# Patient Record
Sex: FEMALE | Race: WHITE | NOT HISPANIC OR LATINO | Employment: STUDENT | URBAN - METROPOLITAN AREA
[De-identification: names, ages, dates, MRNs, and addresses within clinical notes are randomized per-mention and may not be internally consistent; named-entity substitution may affect disease eponyms.]

---
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
## 2023-06-23 ENCOUNTER — TELEPHONE (OUTPATIENT)
Dept: OBSTETRICS AND GYNECOLOGY | Facility: CLINIC | Age: 18
End: 2023-06-23
Payer: MEDICAID

## 2023-06-23 NOTE — TELEPHONE ENCOUNTER
Contacted pt's Mom and notified that Dr Robb does not have any availability for Medicaid at this time.  Escalation number given and Mom voiced understanding.

## 2023-06-23 NOTE — TELEPHONE ENCOUNTER
----- Message from Debby Ruiz sent at 6/23/2023  9:30 AM CDT -----  Contact: pt/[phone]  Type:  Appointment Request    Caller is requesting a sooner appointment.  Caller declined first available appointment listed below.  Caller will not accept being placed on the waitlist and is requesting a message be sent to doctor.  Name of Caller: pt mother    When is the first available appointment? No  dates  given    Symptoms: Est  care  and  pt  is  having  sever  menstrual  cramps   Would the patient rather a call back or a response via [facility]? Call   Best Call Back Number:[phone]  Additional Information:  please  call  pt  mother to  schedule